# Patient Record
Sex: FEMALE | Race: WHITE | NOT HISPANIC OR LATINO | Employment: UNEMPLOYED | URBAN - METROPOLITAN AREA
[De-identification: names, ages, dates, MRNs, and addresses within clinical notes are randomized per-mention and may not be internally consistent; named-entity substitution may affect disease eponyms.]

---

## 2017-12-01 ENCOUNTER — HOSPITAL ENCOUNTER (OUTPATIENT)
Dept: OTHER | Facility: HOSPITAL | Age: 42
Setting detail: SPECIMEN
Discharge: HOME | End: 2017-12-01
Attending: RADIOLOGY | Admitting: RADIOLOGY

## 2018-07-07 ENCOUNTER — APPOINTMENT (OUTPATIENT)
Dept: GENERAL RADIOLOGY | Facility: HOSPITAL | Age: 43
End: 2018-07-07

## 2018-07-07 ENCOUNTER — APPOINTMENT (OUTPATIENT)
Dept: CT IMAGING | Facility: HOSPITAL | Age: 43
End: 2018-07-07

## 2018-07-07 ENCOUNTER — HOSPITAL ENCOUNTER (EMERGENCY)
Facility: HOSPITAL | Age: 43
Discharge: HOME | End: 2018-07-07
Attending: EMERGENCY MEDICINE | Admitting: EMERGENCY MEDICINE

## 2018-07-07 VITALS
HEART RATE: 99 BPM | RESPIRATION RATE: 18 BRPM | TEMPERATURE: 97.8 F | WEIGHT: 230 LBS | DIASTOLIC BLOOD PRESSURE: 68 MMHG | SYSTOLIC BLOOD PRESSURE: 112 MMHG | OXYGEN SATURATION: 99 % | HEIGHT: 66 IN | BODY MASS INDEX: 36.96 KG/M2
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
## 2018-07-07 DIAGNOSIS — M79.601 RIGHT ARM PAIN: ICD-10-CM

## 2018-07-07 DIAGNOSIS — S39.012A STRAIN OF LUMBAR REGION, INITIAL ENCOUNTER: ICD-10-CM

## 2018-07-07 DIAGNOSIS — S46.912A STRAIN OF LEFT SHOULDER, INITIAL ENCOUNTER: ICD-10-CM

## 2018-07-07 DIAGNOSIS — S86.912A KNEE STRAIN, LEFT, INITIAL ENCOUNTER: ICD-10-CM

## 2018-07-07 PROCEDURE — 99284 EMERGENCY DEPT VISIT MOD MDM: CPT

## 2018-07-07 PROCEDURE — 73060 X-RAY EXAM OF HUMERUS: CPT | Performed by: RADIOLOGY

## 2018-07-07 PROCEDURE — 72131 CT LUMBAR SPINE W/O DYE: CPT | Performed by: RADIOLOGY

## 2018-07-07 PROCEDURE — 73562 X-RAY EXAM OF KNEE 3: CPT | Performed by: RADIOLOGY

## 2018-07-07 PROCEDURE — 73030 X-RAY EXAM OF SHOULDER: CPT

## 2018-07-07 PROCEDURE — 73030 X-RAY EXAM OF SHOULDER: CPT | Performed by: RADIOLOGY

## 2018-07-07 PROCEDURE — 72131 CT LUMBAR SPINE W/O DYE: CPT

## 2018-07-07 PROCEDURE — 73060 X-RAY EXAM OF HUMERUS: CPT

## 2018-07-07 PROCEDURE — 73562 X-RAY EXAM OF KNEE 3: CPT

## 2018-07-07 RX ORDER — ORPHENADRINE CITRATE 100 MG/1
100 TABLET, EXTENDED RELEASE ORAL 2 TIMES DAILY PRN
Qty: 10 TABLET | Refills: 0 | Status: SHIPPED | OUTPATIENT
Start: 2018-07-07

## 2018-07-08 NOTE — DISCHARGE INSTRUCTIONS
Call one of the offices below to establish a primary care provider.  If you are unable to get an appointment and feel it is an emergency and need to be seen immediately please return to the Emergency Department.    Call one of the office below to set up a primary care provider.    Dr. Herson Vega                                                                                                       [address] [facility] [address]  [phone]    Dr. Telles, Dr. HEBER Harris, Dr. MIGUEL ANGEL Harris ([facility])  [address] [facility] [address]  [phone]    Dr. Euceda, Dr. Nicole, Dr. Miguel ([facility])  [address] [facility] [address]  [phone]    Dr. Penaloza  [address] [facility] [address]  [phone]    Dr. Colón, Dr. Garcia, Dr. Oneal, Dr. Parrish ([facility])  [address] [place] [address] [address]  [facility] [address]  [phone]    Dr. Maru Christian  [address] [facility] [place] [address]  [phone]    Dr. Leidy Narayan  [address]   [address]  [facility] [address]  [phone]    Dr. Vega  [address] [facility] [address]  [phone]    Dr. Parks, Dr. Sandhu  [address] [facility] [place] [address]  [phone]    Dr. Jauregui  [address]                                                              [address]  [facility] [address]  [phone]    Dr. Muñoz  [address] [facility] [id number] [phone]    Dr. Heidy Maldonado  [address]  [address]  [facility] [address]  [phone]
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
## 2018-07-08 NOTE — ED NOTES
Patient laying in bed, respirations even and unlabored, skin warm pink and dry, no acute distress noted, no requests at this time.      Izzy Tillman RN  07/07/18 2037